# Patient Record
Sex: FEMALE | Employment: FULL TIME
[De-identification: names, ages, dates, MRNs, and addresses within clinical notes are randomized per-mention and may not be internally consistent; named-entity substitution may affect disease eponyms.]

---

## 2022-12-15 ENCOUNTER — TRANSCRIBE ORDERS (OUTPATIENT)
Dept: OTHER | Age: 27
End: 2022-12-15

## 2022-12-15 DIAGNOSIS — K76.0 NAFLD (NONALCOHOLIC FATTY LIVER DISEASE): Primary | ICD-10-CM

## 2022-12-19 NOTE — TELEPHONE ENCOUNTER
DIAGNOSIS: NAFLD (nonalcoholic fatty liver disease   Appt Date: 01.03.2023   NOTES STATUS DETAILS   OFFICE NOTE from referring provider Care Everywhere 11.15.2022 Mirtha De Paz MD  [facility] [place]    OFFICE NOTES from other specialists     DISCHARGE SUMMARY from hospital     MEDICATION LIST Care Everywhere    LIVER BIOSPY (IF APPLICABLE)      PATHOLOGY REPORTS      IMAGING     ENDOSCOPY (IF AVAILABLE)     COLONOSCOPY (IF AVAILABLE)     ULTRASOUND LIVER     CT OF ABDOMEN     MRI OF LIVER     FIBROSCAN, US ELASTOGRAPHY, FIBROSIS SCAN, MR ELASTOGRAPHY     LABS     HEPATIC PANEL (LIVER PANEL) Care Everywhere 11.15.2022   BASIC METABOLIC PANEL Care Everywhere 11.15.2022   COMPLETE METABOLIC PANEL Care Everywhere [phone]   COMPLETE BLOOD COUNT (CBC) Care Everywhere 11.15.2022   INTERNATIONAL NORMALIZED RATIO (INR) Care Everywhere 02.16.2021   HEPATITIS C ANTIBODY Care Everywhere 12.08.2020   HEPATITIS C VIRAL LOAD/PCR     HEPATITIS C GENOTYPE     HEPATITIS B SURFACE ANTIGEN Care Everywhere 12.22.2020   HEPATITIS B SURFACE ANTIBODY Care Everywhere 12.22.2020   HEPATITIS B DNA QUANT LEVEL     HEPATITIS B CORE ANTIBODY Care Everywhere 12.22.2020

## 2023-01-03 ENCOUNTER — PRE VISIT (OUTPATIENT)
Dept: GASTROENTEROLOGY | Facility: CLINIC | Age: 28
End: 2023-01-03

## 2023-01-03 ENCOUNTER — OFFICE VISIT (OUTPATIENT)
Dept: GASTROENTEROLOGY | Facility: CLINIC | Age: 28
End: 2023-01-03
Attending: FAMILY MEDICINE
Payer: COMMERCIAL

## 2023-01-03 VITALS
OXYGEN SATURATION: 98 % | DIASTOLIC BLOOD PRESSURE: 89 MMHG | WEIGHT: 192.9 LBS | HEART RATE: 86 BPM | SYSTOLIC BLOOD PRESSURE: 126 MMHG | TEMPERATURE: 98.2 F

## 2023-01-03 DIAGNOSIS — K75.81 NASH (NONALCOHOLIC STEATOHEPATITIS): Primary | ICD-10-CM

## 2023-01-03 DIAGNOSIS — K74.02 HEPATIC FIBROSIS, ADVANCED FIBROSIS: ICD-10-CM

## 2023-01-03 PROCEDURE — 99205 OFFICE O/P NEW HI 60 MIN: CPT | Performed by: PHYSICIAN ASSISTANT

## 2023-01-03 PROCEDURE — G0463 HOSPITAL OUTPT CLINIC VISIT: HCPCS | Performed by: PHYSICIAN ASSISTANT

## 2023-01-03 RX ORDER — MULTIVIT-MIN/IRON/FOLIC ACID/K 18-600-40
CAPSULE ORAL
COMMUNITY

## 2023-01-03 RX ORDER — MILK THISTLE 150 MG
CAPSULE ORAL
COMMUNITY

## 2023-01-03 ASSESSMENT — PAIN SCALES - GENERAL: PAINLEVEL: MILD PAIN (2)

## 2023-01-03 NOTE — PATIENT INSTRUCTIONS
Physical Activity   - Increase physical activity level by more than 60 minutes/week   - Maintain physical activity more than 150 minutes a week     Limit Carbohydrates In Diet:   What foods are Carbohydrates?    - Main groups: Grains, Fruits, Starchy Vegetables (corn, potatoes, peas, beans) and Sweets/Desserts    When you do have carbohydrates - be mindful of the portions.   - Have ones with more fiber in the them (brown rice, whole grain breads/pastas)   - Fruits - choose ones with edible skins (apples, pears, etc) or seeds (berries)    - Discuss a GLP-1 Inhibitor with your primary care provider (Ozempic/Wegovy). Weight management often prescribes the medication as well.     It was a pleasure to see you at your recent visit. Please let me know if you have any questions or concerns.     Clinic Staff - [phone] option 3     Sincerely,     Kylah Herrera PA-C   [address] [place] [address], Mail Code [id number]  [place]  [address].

## 2023-01-03 NOTE — PROGRESS NOTES
Hepatology Clinic note  Shelly Duncan   Date of Birth 1995  Date of Service 1/3/2023    REASON FOR CONSULTATION: HUGGINS  REFERRING PROVIDER: Pari De Paz MD          Assessment/plan:   Shelly Duncan is a 27 year old female with biopsy-proven HUGGINS and stage 3-4 fibrosis.  She has no overt signs of decompensated liver disease including ascites or hepatic encephalopathy. She has elevated transaminases but otherwise normal liver function.  We reviewed her liver biopsy and diagnosis.  We discussed the natural history of HUGGINS and cirrhosis.  We discussed the treatment for HUGGINS is primarily weight loss and optimization of risk factors (hyperlipidemia and blood glucose as needed).  We also discussed need for routine HCC screening.  Variceal screening does not appear indicated at this time.  She has recently made some slow gradual lifestyle changes and is plugged in with weight management.    # HUGGINS  - Recommend slow gradual weight loss   - Maintain good control of cholesterol (No contraindication to starting a statin with LFT elevations)   - Optimize blood glucose as needed. Can consider use of GLP-1 inhibitor for both insulin resistance and help with weight loss.   - Declines any referral to weight management at this time   - Improve nutrition:  emphasizing limit carbohydrates, especially simple carbohydrates.  - Increase physical activity: maintain physical activity more than 150 minutes a week    # HCC screening:   US Abdomen   - Labs: Hep B, Hep C, Iron panel, alpha-1-antitrypsin deficiency, JM, F-actin, TTG   - Continue avoidance of alcohol     # Follow up in six months     Kylah Herrera PA-C   [facility] Hepat[facility]     Total time for E/M services performed on the date of the encounter 60 minutes.  This included review of previous: clinic visits, hospital records, lab results, imaging studies, and procedural documentation. Time also includes patient visit, documentation and  discussion with other providers.  The findings from this review are summarized in the above note.   -----------------------------------------------------       HPI:   Shelly Duncan is a 27 year old female  presenting for the evaluation of HUGGINS and history of elevated LFT's.     HUGGINS   Risk factors: Hyperlipidemia, obesity  Liver biopsy:   Steatohepatitis, Stage 3-4 Fibrosis   HCC Screenin     Patient was seen by GI at [facility] [place] at which time she had work up for elevated LFT's. She underwent liver biopsy which showed steatohepatitis and stage III-4 fibrosis.  She has been without insurance for a few years and has not reestablished medical care.    Stopped sugary drinks. Being more mindful of food intake. Was doing more take-out, (mexican, bread, chinese) . At home, likes to make soup, chicken, Breakfast (eggs). Generally eats two meal (8-9 and then at 3-4 pm) Typically doesn't eat after 8 pm. Previously 170 lbs, weight up about 18 lbs.   In efforts to optimize her health, she is recently joined a gym.  Gained 70-80 lbs after high school.     Patient denies jaundice, lower extremity edema, abdominal distension or confusion.  Patient also denies melena, hematochezia or hematemesis. Patient denies weight loss, fevers, sweats or chills.    PMH:   Hyperlipidemia, obesity, HUGGINS    SMH:    has no past surgical history on file.     Medications:   This patient does not have an active medication from one of the medication groupers.     No previous tobacco use. No alcohol - one yearly . No significant use.  No previous IV/IN drug use. Occasion gummies. Work [employer]  for [employer]/ [employer]. Patient currently live with roommate. On dad's side: Liver problems, unknown type or cause.     Previous work-up:  HCV antibody nonreactive  Hep A IgG - positive   HBV SAb: reactive  HBV SAg: nonreactive  HBV CAb: nonreactive   JM   F-actin (SMA 81/60) SMA 1:20 and 1:20  AMA 2.8  Ig   Iron panel :    Ferritin 282  Iron Sats:   TTG :   Alpha-1-antitrypsin: 127   Ceruloplasmin 44  TSH 1.96  Cholesterol Total 211  HDL 37    Triglycerides 135  Hemoglobin A1c 5.7         Allergies:   Not on File         Social History:     Social History     Socioeconomic History     Marital status: Single     Spouse name: Not on file     Number of children: Not on file     Years of education: Not on file     Highest education level: Not on file   Occupational History     Not on file   Tobacco Use     Smoking status: Not on file     Smokeless tobacco: Not on file   Substance and Sexual Activity     Alcohol use: Not on file     Drug use: Not on file     Sexual activity: Not on file   Other Topics Concern     Not on file   Social History Narrative     Not on file     Social Determinants of Health     Financial Resource Strain: Not on file   Food Insecurity: Not on file   Transportation Needs: Not on file   Physical Activity: Not on file   Stress: Not on file   Social Connections: Not on file   Intimate Partner Violence: Not on file   Housing Stability: Not on file            Family History:   No family history on file.            Review of Systems:   Gen: See HPI     HEENT: No change in vision or hearing, mouth sores, dysphagia, lymph nodes  Resp: No shortness of breath, coughing, hx of asthma  CV: No chest pain, palpitations, syncope   GI: See HPI  : No dysuria, history of stones, urine color    Skin: No rash; no pruritus or psoriasis  MS: No arthralgias, myalgias, joint swelling  Neuro: No memory changes, confusion, numbness    Heme: No difficulty clotting, bruising, bleeding  Psych:  No anxiety, depression, agitation          Physical Exam:   /89   Pulse 86   Temp 98.2  F (36.8  C) (Oral)   Wt 87.5 kg (192 lb 14.4 oz)   SpO2 98%     Gen: A&Ox3, NAD, well developed  HEENT: non-icteric   CV: RRR, no overt murmurs  Lung: CTA Bilatererally, no wheezing or crackles.   Lym- no palpable lymphadenopathy  Abd: central  "adiposity, soft, NT, ND  no palpable splenomegaly, liver is not palpable.   Ext: no edema, intact pulses.   Skin: No rash, no palmar erythema, telangiectasias or jaundice  Neuro: grossly intact, no asterixis   Psych: appropriate mood and affects         Data:   Reviewed in person and significant for:    Recent outside [place] [facility] [place]   , AST 88, Alk Phos 99, D Bili 0.2, Platelets 215, Albumin 4.4  Total protein 7.8, Hgb: 13.7  Sodium 138        Imaging:      Pathology, liver biopsy 4/6/21:                                         Final Diagnosis:   Final diagnosis by Dr. Harman Brewster:     Liver, random, needle core biopsy -     1)  Steatohepatitis, characterized by:     - Mild (20%) steatosis     - Moderate inflammatory activity     - Early bridging fibrosis and nodular regeneration (stage 3-4)     2)   Negative for superimposed liver disease  This clearly represents steatohepatitis.  While her obesity, hyperlipidemia, lack of reported    alcohol intake, and the ALT/AST ratio all tend to point toward obesity related to non-alcoholic    etiology, the very prominent neutrophils within lobules and fairly abundant Prudence's hyaline    are more typical of alcoholic steatohepatitis.  Very careful questioning about alcohol use is    advised.     At this point she has at least stage 3 disease and the reticulin stain suggests early nodular    regeneration (evolving stage 4).  This is rather alarming at the young age of 25 and attempting    to intervene on the root cause steatohepatitis is advisable.  As the fibrosis here is still    somewhat \"loose\" there is the potential for considerable remodeling of the liver if the    cause of the steatohepatitis can be ameliorated.  Some cases of unusually early fibrosis    in steatohepatitis are associated with abnormal alpha-1 antitrypsin phenotypes and thus I    would recommend serum A1 AT phenotyping.  If she is MZ there would be even greater urgency in "    attempting to treat the steatohepatitis      US ABDOMEN COMPLETE on 12/14/2020:   Indication: elevated liver enzymes       Comparison: no comparison     Technique:   1.  Gray scale imaging of the liver, spleen, pancreas, kidneys, gallbladder, common duct, upper abdominal aorta and IVC   2.  Color Doppler imaging of the portal vein and hepatic artery   3.  Gray scale imaging of IVC, splenic vein and hepatic veins     Findings:     Liver:  The liver measures 18.4 cm with mild homogeneously increased echogenicity without evidence of a mass or ductal dilatation.  The main portal vein is patent with flow towards the liver.     Gallbladder:  No stones, wall thickening or pericholecystic fluid. The common duct measures 0.45 cm     Pancreas: No mass or ductal dilatation.     Right kidney: Measures 11.6 x 4.1 x 5.6 cm.  No hydronephrosis.   Left kidney: Measures 10.8 x 4.8 x 5.4 cm.  No hydronephrosis.     Spleen: Measures 10.2 cm in length.     The aorta and the IVC are unremarkable.  Procedure Note    Roney Jones DO - 12/14/2020   Formatting of this note might be different from the original.   Indication: elevated liver enzymes       Comparison: no comparison     Technique:   1.  Gray scale imaging of the liver, spleen, pancreas, kidneys, gallbladder, common duct, upper abdominal aorta and IVC   2.  Color Doppler imaging of the portal vein and hepatic artery   3.  Gray scale imaging of IVC, splenic vein and hepatic veins     Findings:     Liver:  The liver measures 18.4 cm with mild homogeneously increased echogenicity without evidence of a mass or ductal dilatation.  The main portal vein is patent with flow towards the liver.     Gallbladder:  No stones, wall thickening or pericholecystic fluid. The common duct measures 0.45 cm     Pancreas: No mass or ductal dilatation.     Right kidney: Measures 11.6 x 4.1 x 5.6 cm.  No hydronephrosis.   Left kidney: Measures 10.8 x 4.8 x 5.4 cm.  No hydronephrosis.      Spleen: Measures 10.2 cm in length.     The aorta and the IVC are unremarkable.     IMPRESSION   Impression:   Mild hepatomegaly with steatosis.

## 2023-01-03 NOTE — NURSING NOTE
Chief Complaint   Patient presents with     New Patient       /89   Pulse 86   Temp 98.2  F (36.8  C) (Oral)   Wt 87.5 kg (192 lb 14.4 oz)   SpO2 98%     Michel Cherry on 1/3/2023 at 1:58 PM

## 2023-03-30 ENCOUNTER — LAB REQUISITION (OUTPATIENT)
Dept: LAB | Facility: CLINIC | Age: 28
End: 2023-03-30
Payer: COMMERCIAL

## 2023-03-30 DIAGNOSIS — Z02.89 ENCOUNTER FOR OTHER ADMINISTRATIVE EXAMINATIONS: ICD-10-CM

## 2023-03-30 LAB — T PALLIDUM AB SER QL: NONREACTIVE

## 2023-03-30 PROCEDURE — 86481 TB AG RESPONSE T-CELL SUSP: CPT | Mod: ORL | Performed by: FAMILY MEDICINE

## 2023-03-30 PROCEDURE — 86780 TREPONEMA PALLIDUM: CPT | Mod: ORL | Performed by: FAMILY MEDICINE

## 2023-03-30 PROCEDURE — 87591 N.GONORRHOEAE DNA AMP PROB: CPT | Mod: ORL | Performed by: FAMILY MEDICINE

## 2023-03-31 LAB
GAMMA INTERFERON BACKGROUND BLD IA-ACNC: 0.01 IU/ML
M TB IFN-G BLD-IMP: NEGATIVE
M TB IFN-G CD4+ BCKGRND COR BLD-ACNC: 9.99 IU/ML
MITOGEN IGNF BCKGRD COR BLD-ACNC: 0 IU/ML
MITOGEN IGNF BCKGRD COR BLD-ACNC: 0.01 IU/ML
N GONORRHOEA DNA SPEC QL NAA+PROBE: NEGATIVE
QUANTIFERON MITOGEN: 10 IU/ML
QUANTIFERON NIL TUBE: 0.01 IU/ML
QUANTIFERON TB1 TUBE: 0.01 IU/ML
QUANTIFERON TB2 TUBE: 0.02

## 2023-04-16 ENCOUNTER — HEALTH MAINTENANCE LETTER (OUTPATIENT)
Age: 28
End: 2023-04-16

## 2023-05-01 ENCOUNTER — ANCILLARY PROCEDURE (OUTPATIENT)
Dept: ULTRASOUND IMAGING | Facility: CLINIC | Age: 28
End: 2023-05-01
Attending: PHYSICIAN ASSISTANT
Payer: COMMERCIAL

## 2023-05-01 DIAGNOSIS — K75.81 NASH (NONALCOHOLIC STEATOHEPATITIS): ICD-10-CM

## 2023-05-01 PROCEDURE — 76700 US EXAM ABDOM COMPLETE: CPT | Mod: GC | Performed by: STUDENT IN AN ORGANIZED HEALTH CARE EDUCATION/TRAINING PROGRAM

## 2023-06-26 DIAGNOSIS — K75.81 NASH (NONALCOHOLIC STEATOHEPATITIS): Primary | ICD-10-CM

## 2023-06-26 DIAGNOSIS — K74.02 HEPATIC FIBROSIS, ADVANCED FIBROSIS: ICD-10-CM

## 2024-06-23 ENCOUNTER — HEALTH MAINTENANCE LETTER (OUTPATIENT)
Age: 29
End: 2024-06-23

## 2025-07-12 ENCOUNTER — HEALTH MAINTENANCE LETTER (OUTPATIENT)
Age: 30
End: 2025-07-12